# Patient Record
Sex: FEMALE | Race: WHITE | URBAN - METROPOLITAN AREA
[De-identification: names, ages, dates, MRNs, and addresses within clinical notes are randomized per-mention and may not be internally consistent; named-entity substitution may affect disease eponyms.]

---

## 2021-06-12 ENCOUNTER — EMERGENCY (EMERGENCY)
Facility: HOSPITAL | Age: 61
LOS: 1 days | Discharge: HOME | End: 2021-06-12
Attending: EMERGENCY MEDICINE | Admitting: EMERGENCY MEDICINE
Payer: COMMERCIAL

## 2021-06-12 VITALS
TEMPERATURE: 98 F | DIASTOLIC BLOOD PRESSURE: 66 MMHG | HEART RATE: 90 BPM | SYSTOLIC BLOOD PRESSURE: 121 MMHG | OXYGEN SATURATION: 100 % | RESPIRATION RATE: 16 BRPM

## 2021-06-12 VITALS
HEART RATE: 66 BPM | TEMPERATURE: 98 F | DIASTOLIC BLOOD PRESSURE: 81 MMHG | OXYGEN SATURATION: 97 % | HEIGHT: 65 IN | RESPIRATION RATE: 19 BRPM | WEIGHT: 139.99 LBS | SYSTOLIC BLOOD PRESSURE: 129 MMHG

## 2021-06-12 DIAGNOSIS — Z90.11 ACQUIRED ABSENCE OF RIGHT BREAST AND NIPPLE: Chronic | ICD-10-CM

## 2021-06-12 PROCEDURE — 99283 EMERGENCY DEPT VISIT LOW MDM: CPT | Mod: 25

## 2021-06-12 PROCEDURE — 94640 AIRWAY INHALATION TREATMENT: CPT

## 2021-06-12 PROCEDURE — 99284 EMERGENCY DEPT VISIT MOD MDM: CPT

## 2021-06-12 RX ORDER — ALBUTEROL 90 UG/1
2.5 AEROSOL, METERED ORAL
Refills: 0 | Status: DISCONTINUED | OUTPATIENT
Start: 2021-06-12 | End: 2021-06-16

## 2021-06-12 RX ADMIN — ALBUTEROL 2.5 MILLIGRAM(S): 90 AEROSOL, METERED ORAL at 15:32

## 2021-06-12 NOTE — ED ADULT NURSE NOTE - NSIMPLEMENTINTERV_GEN_ALL_ED
Implemented All Universal Safety Interventions:  [place] to call system. Call bell, personal items and telephone within reach. Instruct patient to call for assistance. Room bathroom lighting operational. Non-slip footwear when patient is off stretcher. Physically safe environment: no spills, clutter or unnecessary equipment. Stretcher in lowest position, wheels locked, appropriate side rails in place.
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
## 2021-06-12 NOTE — ED PROVIDER NOTE - CARE PROVIDER_API CALL
Tyree Mcrae)  Critical Care Medicine; Internal Medicine; Pulmonary Disease  [address], [address]  [place] [address]  Phone: [phone]  Fax: [phone]  Follow Up Time:

## 2021-06-12 NOTE — ED PROVIDER NOTE - CHIEF COMPLAINT
How Severe Are They?: moderate Is This A New Presentation, Or A Follow-Up?: Skin Lesion The patient is a 61y Female complaining of throat, pain.

## 2021-06-12 NOTE — ED PROVIDER NOTE - PATIENT PORTAL LINK FT
You can access the FollowMyHealth Patient Portal offered by [facility] by registering at the following website: [email or web address]. By joining [facility]’s FollowMyHealth portal, you will also be able to view your health information using other applications (apps) compatible with our system.

## 2021-06-12 NOTE — ED PROVIDER NOTE - CPE EDP NEURO NORM
Quality 431: Preventive Care And Screening: Unhealthy Alcohol Use - Screening: Patient screened for unhealthy alcohol use using a single question and scores less than 2 times per year
Quality 130: Documentation Of Current Medications In The Medical Record: Current Medications with Name, Dosage, Frequency, or Route not Documented, Reason not Given
Quality 226: Preventive Care And Screening: Tobacco Use: Screening And Cessation Intervention: Patient screened for tobacco use and is an ex/non-smoker
Detail Level: Detailed
normal...

## 2021-06-12 NOTE — ED PROVIDER NOTE - CONSTITUTIONAL, MLM
Uncomfortable appearing white female, awake, alert, oriented to person, place, time/situation and in mild apparent distress. normal...

## 2024-12-14 NOTE — ED ADULT NURSE NOTE - RESPIRATION RHYTHM, QM
Time reflects when diagnosis was documented in both MDM as applicable and the Disposition within this note       Time User Action Codes Description Comment    12/14/2024 10:36 PM David Hartman Add [R07.9] Chest pain           ED Disposition       ED Disposition   Discharge    Condition   Stable    Date/Time   Sat Dec 14, 2024 10:36 PM    Comment   Arlene Francois discharge to home/self care.                   Assessment & Plan       Medical Decision Making  Chest pain differential includes acute coronary syndrome musculoskeletal pain esophagitis pulmonary embolism workup in progress EKG chest x-ray lab work    Amount and/or Complexity of Data Reviewed  Labs: ordered.  Radiology: ordered and independent interpretation performed.    Risk  OTC drugs.  Prescription drug management.        ED Course as of 12/14/24 2338   Sat Dec 14, 2024   2232 Delta troponin is 0       Medications   acetaminophen (Ofirmev) injection 1,000 mg (0 mg Intravenous Stopped 12/14/24 1905)   aspirin chewable tablet 324 mg (324 mg Oral Given 12/14/24 1840)   ondansetron (ZOFRAN) injection 4 mg (4 mg Intravenous Given 12/14/24 1854)   fentaNYL injection 25 mcg (25 mcg Intravenous Given 12/14/24 1948)   iohexol (OMNIPAQUE) 350 MG/ML injection (MULTI-DOSE) 75 mL (75 mL Intravenous Given 12/14/24 2056)       ED Risk Strat Scores      HEART Risk Score      Flowsheet Row Most Recent Value   Heart Score Risk Calculator    History 0 Filed at: 12/14/2024 1930   ECG 0 Filed at: 12/14/2024 1930   Age 0 Filed at: 12/14/2024 1930   Risk Factors 0 Filed at: 12/14/2024 1930   Troponin 0 Filed at: 12/14/2024 1930   HEART Score 0 Filed at: 12/14/2024 1930                        PERC Rule for PE      Flowsheet Row Most Recent Value   PERC Rule for PE    Age >=50 0 Filed at: 12/14/2024 1854   HR >=100 1 Filed at: 12/14/2024 1854   O2 Sat on room air < 95% 0 Filed at: 12/14/2024 1854   History of PE or DVT 0 Filed at: 12/14/2024 1854   Recent trauma or surgery 0  Filed at: 12/14/2024 1854   Hemoptysis 0 Filed at: 12/14/2024 1854   Exogenous estrogen 0 Filed at: 12/14/2024 1854   Unilateral leg swelling 0 Filed at: 12/14/2024 1854   PERC Rule for PE Results 1 Filed at: 12/14/2024 1854            SBIRT 20yo+      Flowsheet Row Most Recent Value   Initial Alcohol Screen: US AUDIT-C     1. How often do you have a drink containing alcohol? 0 Filed at: 12/14/2024 1836   2. How many drinks containing alcohol do you have on a typical day you are drinking?  0 Filed at: 12/14/2024 1836   3a. Male UNDER 65: How often do you have five or more drinks on one occasion? 0 Filed at: 12/14/2024 1836   3b. FEMALE Any Age, or MALE 65+: How often do you have 4 or more drinks on one occassion? 0 Filed at: 12/14/2024 1836   Audit-C Score 0 Filed at: 12/14/2024 1836   NABILA: How many times in the past year have you...    Used an illegal drug or used a prescription medication for non-medical reasons? Never Filed at: 12/14/2024 1836            Wells' Criteria for PE      Flowsheet Row Most Recent Value   Wells' Criteria for PE    Clinical signs and symptoms of DVT 0 Filed at: 12/14/2024 1853   PE is primary diagnosis or equally likely 0 Filed at: 12/14/2024 1853   HR >100 1.5 Filed at: 12/14/2024 1853   Immobilization at least 3 days or Surgery in the previous 4 weeks 0 Filed at: 12/14/2024 1853   Previous, objectively diagnosed PE or DVT 0 Filed at: 12/14/2024 1853   Hemoptysis 0 Filed at: 12/14/2024 1853   Malignancy with treatment within 6 months or palliative 0 Filed at: 12/14/2024 1853   Wells' Criteria Total 1.5 Filed at: 12/14/2024 1853                        History of Present Illness       Chief Complaint   Patient presents with    Chest Pain     Pt states that she started having cp 20mins ago while sitting in the car. Pt had been sitting in the car for 2 hr. Pt states that the pain travels into shoulder and left hand.        Past Medical History:   Diagnosis Date    Abnormal Pap smear of  cervix     AMA (advanced maternal age) multigravida 35+     Anemia     Asthma     Diabetes mellitus (HCC)     gd    Disease of thyroid gland     Fibroid     Was told during last pregnancy had fibroid. Asymtomtic    Hypertension     pre ecl    Hypothyroidism     Kidney stone     Years ago. Had during last pregnancy    RSV (acute bronchiolitis due to respiratory syncytial virus)     Varicella     Patient did at child      Past Surgical History:   Procedure Laterality Date     SECTION  2009     SECTION  2012    CHOLECYSTECTOMY  2013    CHOLECYSTECTOMY      DENTAL SURGERY      [place] teeth extraction    VT  DELIVERY ONLY N/A 2020    Procedure:  SECTION () REPEAT;  Surgeon: Bonita Albrecht MD;  [facility]: [facility];  Service: Obstetrics    VT LIG/TRNSXJ FALOPIAN TUBE  DEL/ABDML SURG Bilateral 2020    Procedure: LIGATION/COAGULATION TUBAL;  Surgeon: Bonita Albrecht MD;  Location: [facility];  Service: Obstetrics    UMBILICAL HERNIA REPAIR        Family History   Problem Relation Age of Onset    COPD Mother     Heart disease Mother     Alcohol abuse Father     Depression Father     Drug abuse Father     Other Father         seizures, HIV    Depression Sister     Other Sister         seizures,  ptsd    Mental illness Sister         anx    Depression Daughter     Other Daughter         seizures    Mental illness Daughter         anx    No Known Problems Daughter     Other Maternal Grandmother         parkinsons    No Known Problems Maternal Grandfather     Other Paternal Grandmother         hypothyroid    No Known Problems Paternal Grandfather     Depression Brother     Mental illness Brother         anx, bipolar    Mental illness Son         add, adhd, anx    No Known Problems Half-Sister     No Known Problems Half-Sister     Diabetes Half-Brother         type 2      Social History     Tobacco Use    Smoking status: Never    Smokeless tobacco: Never    Vaping Use    Vaping status: Never Used   Substance Use Topics    Alcohol use: Not Currently     Comment: not currently    Drug use: No      E-Cigarette/Vaping    E-Cigarette Use Never User       E-Cigarette/Vaping Substances    Nicotine No     THC No     CBD No     Flavoring No     Other No     Unknown No       I have reviewed and agree with the history as documented.     This is a 43-year-old female presents with sharp left sided chest pain that started 20 minutes ago while she was riding in a car.  Does have some tenderness to palpation anterior left chest wall.  No prior history of DVT or PE.  No current treatment for diabetes or hypertension she did have gestational hypertension resolved after delivery.  She is a non-smoker.  Denies any recent trauma or heavy lifting      History provided by:  Patient  Medical Problem  Location:  Left chest  Quality:  Sharp pain  Severity:  Moderate  Onset quality:  Sudden  Duration:  20 minutes  Timing:  Constant  Progression:  Unchanged  Chronicity:  New  Context:  Left chest sharp pain  Worsened by:  Palpation  Associated symptoms: chest pain and shortness of breath    Associated symptoms: no cough and no fever        Review of Systems   Constitutional:  Negative for fever.   Respiratory:  Positive for shortness of breath. Negative for cough.    Cardiovascular:  Positive for chest pain.   All other systems reviewed and are negative.          Objective       ED Triage Vitals   Temperature Pulse Blood Pressure Respirations SpO2 Patient Position - Orthostatic VS   12/14/24 1829 12/14/24 1829 12/14/24 1829 12/14/24 1829 12/14/24 1829 12/14/24 1829   97.8 °F (36.6 °C) (!) 118 (!) 182/93 20 97 % Lying      Temp Source Heart Rate Source BP Location FiO2 (%) Pain Score    12/14/24 1829 12/14/24 1829 12/14/24 1829 -- 12/14/24 1837    Oral Monitor Left arm  7      Vitals      Date and Time Temp Pulse SpO2 Resp BP Pain Score FACES Pain Rating User   12/14/24 2000 -- 93 97 % 20 136/70  -- --    12/14/24 1948 -- -- -- -- -- 7 --    12/14/24 1915 -- 100 97 % 20 134/68 -- --    12/14/24 1837 -- 108 -- 16 -- 7 --    12/14/24 1829 97.8 °F (36.6 °C) 118 97 % 20 182/93 -- -- LD            Physical Exam  Vitals reviewed.   Constitutional:       General: She is not in acute distress.     Appearance: She is not ill-appearing, toxic-appearing or diaphoretic.   HENT:      Head: Normocephalic and atraumatic.      Right Ear: External ear normal.      Left Ear: External ear normal.   Eyes:      General:         Right eye: No discharge.         Left eye: No discharge.      Extraocular Movements: Extraocular movements intact.      Pupils: Pupils are equal, round, and reactive to light.   Cardiovascular:      Rate and Rhythm: Regular rhythm. Tachycardia present.      Pulses: Normal pulses.      Heart sounds: No murmur heard.     No friction rub. No gallop.   Pulmonary:      Effort: No respiratory distress.      Breath sounds: No stridor. No wheezing, rhonchi or rales.   Chest:      Chest wall: Tenderness present.   Abdominal:      General: There is no distension.      Palpations: Abdomen is soft.      Tenderness: There is no abdominal tenderness. There is no guarding or rebound.   Musculoskeletal:         General: No swelling, tenderness, deformity or signs of injury.      Cervical back: Neck supple.      Right lower leg: No edema.      Left lower leg: No edema.   Skin:     General: Skin is warm and dry.      Coloration: Skin is not jaundiced.      Findings: No bruising, erythema or rash.   Neurological:      General: No focal deficit present.      Mental Status: She is alert and oriented to person, place, and time.      Cranial Nerves: No cranial nerve deficit.      Sensory: No sensory deficit.      Coordination: Coordination normal.   Psychiatric:         Mood and Affect: Mood normal.         Behavior: Behavior normal.         Thought Content: Thought content normal.         Results Reviewed        Procedure Component Value Units Date/Time    HS Troponin I 2hr [[id number]]  (Normal) Collected: 12/14/24 2043    Lab Status: Final result Specimen: Blood from Hand, Left Updated: 12/14/24 2117     hs TnI 2hr 3 ng/L      Delta 2hr hsTnI 0 ng/L     HS Troponin I 4hr [[id number]]     Lab Status: No result Specimen: Blood     B-Type Natriuretic Peptide(BNP) [[id number]]  (Normal) Collected: 12/14/24 1839    Lab Status: Final result Specimen: Blood from Arm, Left Updated: 12/14/24 1911     BNP 70 pg/mL     HS Troponin 0hr (reflex protocol) [[id number]]  (Normal) Collected: 12/14/24 1839    Lab Status: Final result Specimen: Blood from Arm, Left Updated: 12/14/24 1907     hs TnI 0hr 3 ng/L     Comprehensive metabolic panel [[id number]] Collected: 12/14/24 1839    Lab Status: Final result Specimen: Blood from Arm, Left Updated: 12/14/24 1904     Sodium 140 mmol/L      Potassium 3.6 mmol/L      Chloride 104 mmol/L      CO2 28 mmol/L      ANION GAP 8 mmol/L      BUN 12 mg/dL      Creatinine 0.67 mg/dL      Glucose 111 mg/dL      Calcium 9.4 mg/dL      AST 13 U/L      ALT 16 U/L      Alkaline Phosphatase 95 U/L      Total Protein 8.0 g/dL      Albumin 4.1 g/dL      Total Bilirubin 0.28 mg/dL      eGFR 107 ml/min/1.73sq m     Narrative:      National Kidney Disease Foundation guidelines for Chronic Kidney Disease (CKD):     Stage 1 with normal or high GFR (GFR > 90 mL/min/1.73 square meters)    Stage 2 Mild CKD (GFR = 60-89 mL/min/1.73 square meters)    Stage 3A Moderate CKD (GFR = 45-59 mL/min/1.73 square meters)    Stage 3B Moderate CKD (GFR = 30-44 mL/min/1.73 square meters)    Stage 4 Severe CKD (GFR = 15-29 mL/min/1.73 square meters)    Stage 5 End Stage CKD (GFR <15 mL/min/1.73 square meters)  Note: GFR calculation is accurate only with a steady state creatinine    D-Dimer [[id number]]  (Abnormal) Collected: 12/14/24 1839    Lab Status: Final result Specimen: Blood from Arm, Left Updated: 12/14/24 1902     D-Dimer, Quant 0.52  ug/ml FEU     Protime-INR [[id number]]  (Normal) Collected: 12/14/24 1839    Lab Status: Final result Specimen: Blood from Arm, Left Updated: 12/14/24 1858     Protime 13.5 seconds      INR 0.98    Narrative:      INR Therapeutic Range    Indication                                             INR Range      Atrial Fibrillation                                               2.0-3.0  Hypercoagulable State                                    2.0.2.3  Left Ventricular Asist Device                            2.0-3.0  Mechanical Heart Valve                                  -    Aortic(with afib, MI, embolism, HF, LA enlargement,    and/or coagulopathy)                                     2.0-3.0 (2.5-3.5)     Mitral                                                             2.5-3.5  Prosthetic/Bioprosthetic Heart Valve               2.0-3.0  Venous thromboembolism (VTE: VT, PE        2.0-3.0    APTT [[id number]]  (Normal) Collected: 12/14/24 1839    Lab Status: Final result Specimen: Blood from Arm, Left Updated: 12/14/24 1858     PTT 26 seconds     CBC and differential [[id number]]  (Abnormal) Collected: 12/14/24 1839    Lab Status: Final result Specimen: Blood from Arm, Left Updated: 12/14/24 1846     WBC 10.11 Thousand/uL      RBC 4.68 Million/uL      Hemoglobin 12.3 g/dL      Hematocrit 39.1 %      MCV 84 fL      MCH 26.3 pg      MCHC 31.5 g/dL      RDW 14.5 %      MPV 10.5 fL      Platelets 309 Thousands/uL      nRBC 0 /100 WBCs      Segmented % 69 %      Immature Grans % 1 %      Lymphocytes % 18 %      Monocytes % 7 %      Eosinophils Relative 5 %      Basophils Relative 0 %      Absolute Neutrophils 7.04 Thousands/µL      Absolute Immature Grans 0.07 Thousand/uL      Absolute Lymphocytes 1.77 Thousands/µL      Absolute Monocytes 0.70 Thousand/µL      Eosinophils Absolute 0.49 Thousand/µL      Basophils Absolute 0.04 Thousands/µL             CTA chest pe study   Final Interpretation by Alfonzo Jaimes MD (12/14  9292)      No pulmonary embolus. No CT evidence of right heart strain.   Exam limited secondary to overall noise from body habitus as well as respiratory motion peripherally.      Diffuse mosaic attenuation consistent small vessel airways disease/history of asthma.      The study was marked in EPIC for immediate notification.                  Workstation performed: [id number]         XR chest 1 view portable   ED Interpretation by David Hartman DO (12/14 1853)   No acute infiltrate pneumothorax or congestive heart failure          ECG 12 Lead Documentation Only    Date/Time: 12/14/2024 6:39 PM    Performed by: David Hartman DO  Authorized by: David Hartman DO    ECG reviewed by me, the ED Provider: yes    Patient location:  ED  Rate:     ECG rate:  108  Rhythm:     Rhythm: sinus tachycardia    Conduction:     Conduction: normal    T waves:     T waves: normal        ED Medication and Procedure Management   Prior to Admission Medications   Prescriptions Last Dose Informant Patient Reported? Taking?   albuterol (2.5 mg/3 mL) 0.083 % nebulizer solution   No No   Sig: Take 3 mL (2.5 mg total) by nebulization every 6 (six) hours as needed for wheezing or shortness of breath   albuterol (PROVENTIL HFA,VENTOLIN HFA) 90 mcg/act inhaler   No No   Sig: Inhale 2 puffs every 4 (four) hours as needed for wheezing or shortness of breath   betamethasone dipropionate (DIPROSONE) 0.05 % cream   No No   Sig: Apply topically 2 (two) times a day   levothyroxine 75 mcg tablet   No No   Sig: Take 1 tablet (75 mcg total) by mouth daily   mometasone (ELOCON) 0.1 % cream   No No   Sig: Apply topically daily      Facility-Administered Medications: None     Discharge Medication List as of 12/14/2024 10:36 PM        CONTINUE these medications which have NOT CHANGED    Details   albuterol (2.5 mg/3 mL) 0.083 % nebulizer solution Take 3 mL (2.5 mg total) by nebulization every 6 (six) hours as needed for wheezing or shortness of  breath, Starting Fri 8/9/2024, Normal      albuterol (PROVENTIL HFA,VENTOLIN HFA) 90 mcg/act inhaler Inhale 2 puffs every 4 (four) hours as needed for wheezing or shortness of breath, Starting Fri 8/9/2024, Normal      betamethasone dipropionate (DIPROSONE) 0.05 % cream Apply topically 2 (two) times a day, Starting Fri 8/9/2024, Normal      levothyroxine 75 mcg tablet Take 1 tablet (75 mcg total) by mouth daily, Starting Fri 8/9/2024, Normal      mometasone (ELOCON) 0.1 % cream Apply topically daily, Starting Fri 8/9/2024, Normal           No discharge procedures on file.  ED SEPSIS DOCUMENTATION   Time reflects when diagnosis was documented in both MDM as applicable and the Disposition within this note       Time User Action Codes Description Comment    12/14/2024 10:36 PM David Hartman Add [R07.9] Chest pain                  David Hartman DO  12/14/24 0294     regular